# Patient Record
Sex: MALE | Race: WHITE | NOT HISPANIC OR LATINO
[De-identification: names, ages, dates, MRNs, and addresses within clinical notes are randomized per-mention and may not be internally consistent; named-entity substitution may affect disease eponyms.]

---

## 2017-03-29 ENCOUNTER — APPOINTMENT (OUTPATIENT)
Dept: DERMATOLOGY | Facility: CLINIC | Age: 72
End: 2017-03-29

## 2018-03-28 ENCOUNTER — APPOINTMENT (OUTPATIENT)
Dept: DERMATOLOGY | Facility: CLINIC | Age: 73
End: 2018-03-28
Payer: MEDICARE

## 2018-03-28 PROCEDURE — 17000 DESTRUCT PREMALG LESION: CPT

## 2018-03-28 PROCEDURE — 99213 OFFICE O/P EST LOW 20 MIN: CPT | Mod: 25

## 2018-03-28 PROCEDURE — 17003 DESTRUCT PREMALG LES 2-14: CPT

## 2018-06-22 ENCOUNTER — APPOINTMENT (OUTPATIENT)
Dept: DERMATOLOGY | Facility: CLINIC | Age: 73
End: 2018-06-22
Payer: MEDICARE

## 2018-06-22 PROCEDURE — 99213 OFFICE O/P EST LOW 20 MIN: CPT

## 2019-02-27 ENCOUNTER — APPOINTMENT (OUTPATIENT)
Dept: DERMATOLOGY | Facility: CLINIC | Age: 74
End: 2019-02-27
Payer: MEDICARE

## 2019-02-27 PROCEDURE — 17003 DESTRUCT PREMALG LES 2-14: CPT

## 2019-02-27 PROCEDURE — 17110 DESTRUCTION B9 LES UP TO 14: CPT

## 2019-02-27 PROCEDURE — 17000 DESTRUCT PREMALG LESION: CPT | Mod: 59

## 2019-02-27 PROCEDURE — 99214 OFFICE O/P EST MOD 30 MIN: CPT | Mod: 25

## 2019-03-28 ENCOUNTER — APPOINTMENT (OUTPATIENT)
Dept: DERMATOLOGY | Facility: CLINIC | Age: 74
End: 2019-03-28

## 2019-06-15 ENCOUNTER — EMERGENCY (EMERGENCY)
Facility: HOSPITAL | Age: 74
LOS: 1 days | Discharge: HOME | End: 2019-06-15
Attending: EMERGENCY MEDICINE
Payer: MEDICARE

## 2019-06-15 VITALS
WEIGHT: 195.99 LBS | OXYGEN SATURATION: 97 % | TEMPERATURE: 98 F | HEART RATE: 81 BPM | HEIGHT: 70 IN | DIASTOLIC BLOOD PRESSURE: 75 MMHG | SYSTOLIC BLOOD PRESSURE: 111 MMHG | RESPIRATION RATE: 18 BRPM

## 2019-06-15 VITALS
OXYGEN SATURATION: 99 % | RESPIRATION RATE: 20 BRPM | DIASTOLIC BLOOD PRESSURE: 75 MMHG | SYSTOLIC BLOOD PRESSURE: 144 MMHG | HEART RATE: 76 BPM

## 2019-06-15 LAB
ALBUMIN SERPL ELPH-MCNC: 3.8 G/DL — SIGNIFICANT CHANGE UP (ref 3.3–5.2)
ALP SERPL-CCNC: 87 U/L — SIGNIFICANT CHANGE UP (ref 40–120)
ALT FLD-CCNC: 10 U/L — SIGNIFICANT CHANGE UP
ANION GAP SERPL CALC-SCNC: 15 MMOL/L — SIGNIFICANT CHANGE UP (ref 5–17)
AST SERPL-CCNC: 17 U/L — SIGNIFICANT CHANGE UP
BASOPHILS # BLD AUTO: 0 K/UL — SIGNIFICANT CHANGE UP (ref 0–0.2)
BASOPHILS NFR BLD AUTO: 0.4 % — SIGNIFICANT CHANGE UP (ref 0–2)
BILIRUB SERPL-MCNC: 0.3 MG/DL — LOW (ref 0.4–2)
BUN SERPL-MCNC: 14 MG/DL — SIGNIFICANT CHANGE UP (ref 8–20)
CALCIUM SERPL-MCNC: 9.3 MG/DL — SIGNIFICANT CHANGE UP (ref 8.6–10.2)
CHLORIDE SERPL-SCNC: 99 MMOL/L — SIGNIFICANT CHANGE UP (ref 98–107)
CO2 SERPL-SCNC: 23 MMOL/L — SIGNIFICANT CHANGE UP (ref 22–29)
CREAT SERPL-MCNC: 0.71 MG/DL — SIGNIFICANT CHANGE UP (ref 0.5–1.3)
CRP SERPL-MCNC: 4.06 MG/DL — HIGH (ref 0–0.4)
EOSINOPHIL # BLD AUTO: 0 K/UL — SIGNIFICANT CHANGE UP (ref 0–0.5)
EOSINOPHIL NFR BLD AUTO: 0.4 % — SIGNIFICANT CHANGE UP (ref 0–6)
ERYTHROCYTE [SEDIMENTATION RATE] IN BLOOD: 49 MM/HR — HIGH (ref 0–20)
GLUCOSE SERPL-MCNC: 105 MG/DL — SIGNIFICANT CHANGE UP (ref 70–115)
HCT VFR BLD CALC: 35.1 % — LOW (ref 42–52)
HGB BLD-MCNC: 11.8 G/DL — LOW (ref 14–18)
LYMPHOCYTES # BLD AUTO: 1.8 K/UL — SIGNIFICANT CHANGE UP (ref 1–4.8)
LYMPHOCYTES # BLD AUTO: 17.6 % — LOW (ref 20–55)
MCHC RBC-ENTMCNC: 29.9 PG — SIGNIFICANT CHANGE UP (ref 27–31)
MCHC RBC-ENTMCNC: 33.6 G/DL — SIGNIFICANT CHANGE UP (ref 32–36)
MCV RBC AUTO: 89.1 FL — SIGNIFICANT CHANGE UP (ref 80–94)
MONOCYTES # BLD AUTO: 1.3 K/UL — HIGH (ref 0–0.8)
MONOCYTES NFR BLD AUTO: 12.6 % — HIGH (ref 3–10)
NEUTROPHILS # BLD AUTO: 7.1 K/UL — SIGNIFICANT CHANGE UP (ref 1.8–8)
NEUTROPHILS NFR BLD AUTO: 68.8 % — SIGNIFICANT CHANGE UP (ref 37–73)
NT-PROBNP SERPL-SCNC: 135 PG/ML — SIGNIFICANT CHANGE UP (ref 0–300)
PLATELET # BLD AUTO: 528 K/UL — HIGH (ref 150–400)
POTASSIUM SERPL-MCNC: 4.3 MMOL/L — SIGNIFICANT CHANGE UP (ref 3.5–5.3)
POTASSIUM SERPL-SCNC: 4.3 MMOL/L — SIGNIFICANT CHANGE UP (ref 3.5–5.3)
PROT SERPL-MCNC: 7.5 G/DL — SIGNIFICANT CHANGE UP (ref 6.6–8.7)
RBC # BLD: 3.94 M/UL — LOW (ref 4.6–6.2)
RBC # FLD: 12.7 % — SIGNIFICANT CHANGE UP (ref 11–15.6)
SODIUM SERPL-SCNC: 137 MMOL/L — SIGNIFICANT CHANGE UP (ref 135–145)
TROPONIN T SERPL-MCNC: <0.01 NG/ML — SIGNIFICANT CHANGE UP (ref 0–0.06)
WBC # BLD: 10.3 K/UL — SIGNIFICANT CHANGE UP (ref 4.8–10.8)
WBC # FLD AUTO: 10.3 K/UL — SIGNIFICANT CHANGE UP (ref 4.8–10.8)

## 2019-06-15 PROCEDURE — 99284 EMERGENCY DEPT VISIT MOD MDM: CPT

## 2019-06-15 PROCEDURE — 99223 1ST HOSP IP/OBS HIGH 75: CPT

## 2019-06-15 PROCEDURE — 85027 COMPLETE CBC AUTOMATED: CPT

## 2019-06-15 PROCEDURE — 99284 EMERGENCY DEPT VISIT MOD MDM: CPT | Mod: 25

## 2019-06-15 PROCEDURE — 83880 ASSAY OF NATRIURETIC PEPTIDE: CPT

## 2019-06-15 PROCEDURE — 85652 RBC SED RATE AUTOMATED: CPT

## 2019-06-15 PROCEDURE — 74176 CT ABD & PELVIS W/O CONTRAST: CPT | Mod: 26

## 2019-06-15 PROCEDURE — 96365 THER/PROPH/DIAG IV INF INIT: CPT

## 2019-06-15 PROCEDURE — 72131 CT LUMBAR SPINE W/O DYE: CPT | Mod: 26

## 2019-06-15 PROCEDURE — 70450 CT HEAD/BRAIN W/O DYE: CPT | Mod: 26

## 2019-06-15 PROCEDURE — 82550 ASSAY OF CK (CPK): CPT

## 2019-06-15 PROCEDURE — 93005 ELECTROCARDIOGRAM TRACING: CPT

## 2019-06-15 PROCEDURE — 86140 C-REACTIVE PROTEIN: CPT

## 2019-06-15 PROCEDURE — 80053 COMPREHEN METABOLIC PANEL: CPT

## 2019-06-15 PROCEDURE — 70450 CT HEAD/BRAIN W/O DYE: CPT

## 2019-06-15 PROCEDURE — 36415 COLL VENOUS BLD VENIPUNCTURE: CPT

## 2019-06-15 PROCEDURE — 74176 CT ABD & PELVIS W/O CONTRAST: CPT

## 2019-06-15 PROCEDURE — 82553 CREATINE MB FRACTION: CPT

## 2019-06-15 PROCEDURE — 93010 ELECTROCARDIOGRAM REPORT: CPT

## 2019-06-15 PROCEDURE — 84484 ASSAY OF TROPONIN QUANT: CPT

## 2019-06-15 RX ORDER — DEXAMETHASONE 0.5 MG/5ML
10 ELIXIR ORAL ONCE
Refills: 0 | Status: COMPLETED | OUTPATIENT
Start: 2019-06-15 | End: 2019-06-15

## 2019-06-15 RX ORDER — DUTASTERIDE 0.5 MG/1
1 CAPSULE, LIQUID FILLED ORAL
Qty: 0 | Refills: 0 | DISCHARGE

## 2019-06-15 RX ORDER — CHOLECALCIFEROL (VITAMIN D3) 125 MCG
1 CAPSULE ORAL
Qty: 0 | Refills: 0 | DISCHARGE

## 2019-06-15 RX ORDER — TAMSULOSIN HYDROCHLORIDE 0.4 MG/1
1 CAPSULE ORAL
Qty: 0 | Refills: 0 | DISCHARGE

## 2019-06-15 RX ORDER — OMEPRAZOLE 10 MG/1
1 CAPSULE, DELAYED RELEASE ORAL
Qty: 0 | Refills: 0 | DISCHARGE

## 2019-06-15 RX ORDER — RAMIPRIL 5 MG
1 CAPSULE ORAL
Qty: 0 | Refills: 0 | DISCHARGE

## 2019-06-15 RX ADMIN — Medication 10 MILLIGRAM(S): at 16:09

## 2019-06-15 RX ADMIN — Medication 102 MILLIGRAM(S): at 15:33

## 2019-06-15 NOTE — ED ADULT NURSE REASSESSMENT NOTE - NS ED NURSE REASSESS COMMENT FT1
received report from SOFI Velázquez, taking over pt care, pt status unchanged, refer to flowsheet and chart, pt safety maintained, pt hemodynamically stable, pending PT eval post medication administration prior to dispo and future POC

## 2019-06-15 NOTE — ED ADULT NURSE REASSESSMENT NOTE - NS ED NURSE REASSESS COMMENT FT1
pt hemodynamically stable, PIV removed, refer to flowsheet and chart, pt to be discharged, pt understood discharge instructions and plan of care, pt to follow up out-patient, pt able to ambulate with minimal assist

## 2019-06-15 NOTE — ED ADULT NURSE NOTE - OBJECTIVE STATEMENT
received in room accompanied by wife c.o progressively worsening generalized weakness swelling to bilateral lower legs

## 2019-06-15 NOTE — CONSULT NOTE ADULT - SUBJECTIVE AND OBJECTIVE BOX
[facility]                                        [facility] [place]                                  Alex Love, & Júnior                                      [address]                                           [place], [place], [address]                                                [phone]        CC: pain in the joints and "weakness"     HISTORY:  The patient is a 74y Male who reports that in February he began to notice some limitations in motion. This began in the shoulders. He noted pain when he would try to lift his arms over his head. Over the next several months the pain progressed to involve all his joints. He has had increasing difficulty with mobility including ambulation. He has severe pain in the hips and knees and ankles when he tries to walk. This has now progressed to the point where he was unable to get up today.   He had been evaluated by his PCP and an orthopedist and nothing specific was found.   I was called by the ER due to perceived weakness.     PAST MEDICAL & SURGICAL HISTORY:  Hypertension   Cholesterol  Reflux  Prostate disease.     MEDICATION PRIOR TO ADMISSION:  Rosuvastatin (on hold).  Dutasteride  Ramipril  Tamsulosin  Omeprazole  Aspirin    Allergies  No Known Allergies    SOCIAL HISTORY:  Non smoker.     FAMILY HISTORY:  Father: dementia  Mother: breast cancer   Sister: breast cancer   Daughter: Lupus.    ROS:  Constitutional: The patient denies fevers or weight changes.  Neuro: As per HPI.  Eyes: Denies blurry vision.  Ears/nose/throat: Denies Tinnitus.   Cardiac: Denies chest pain. Denies palpitations.  Respiratory: Denies shortness of breath.  GI: Denies abdominal pain, nausea, or vomiting.  : Denies change in urinary pattern.  Integumentary: Denies rash.  Psych: Denies recent mood changes.  Heme: denies easy bleeding/bruising.    Exam:  Vital Signs Last 24 Hrs  T(C): 36.7 (15 Sorin 2019 10:14), Max: 36.7 (15 Sorin 2019 10:14)  T(F): 98 (15 Sorin 2019 10:14), Max: 98 (15 Sorin 2019 10:14)  HR: 81 (15 Sorin 2019 10:14) (81 - 81)  BP: 111/75 (15 Sorin 2019 10:14) (111/75 - 111/75)  RR: 18 (15 Sorin 2019 10:14) (18 - 18)  SpO2: 97% (15 Sorin 2019 10:14) (97% - 97%)  General: NAD.   Carotid bruits absent.   There is tenderness in all joints to palpation.   There is swelling in the ankles bilaterally.     Mental status: The patient is awake, alert, and fully oriented. There is no aphasia.    Cranial nerves: There is no papilledema. Pupils react symmetrically to light. There is no visual field deficit to confrontation. Extraocular motion is full with no nystagmus. There is no ptosis. Facial sensation is intact. Facial musculature is symmetric. Palate elevates symmetrically. Tongue is midline.    Motor: There is normal muscle bulk. Muscle tone is difficult to assess due to pain with passive movement of joints.   Strength is 5/5 in the upper extremities briefly although cannot abduct at deltoids due to shoulder pain. Can only flex hips ~15 degrees before pain becomes limiting. Can flex knees ~30 degrees before pain limits him.     Sensation: Intact to light touch and pin.    Reflexes: 2+ throughout and plantar responses are flexor.    Cerebellar: He is unable to perform finger nose finger due to pain. There is no dysmetria when going from one finger to another.    LABS:                         11.8   10.3  )-----------( 528      ( 15 Sorin 2019 11:39 )             35.1       06-15    137  |  99  |  14.0  ----------------------------<  105  4.3   |  23.0  |  0.71    Ca    9.3      15 Osrin 2019 11:39    TPro  7.5  /  Alb  3.8  /  TBili  0.3<L>  /  DBili  x   /  AST  17  /  ALT  10  /  AlkPhos  87  06-15      RADIOLOGY   CT head pending.

## 2019-06-15 NOTE — ED STATDOCS - PROGRESS NOTE DETAILS
Patient with PMH, HLD, HTN, BPH presents complaining of swelling in joints and generalized weakness. Symptoms started in february, initially in shoulders, was sent to an orthopedist who did imaging of neck and was told it was herniated discs and went to pain management, who put him on neurontin. Meanwhile, he has worsening edema of the lower legs. He was started on 20 mg of furosemide which has not made any difference in the swelling. He also saw his cardiologist (Jacquelin) who increased his statin from 20 to 40 mg at which point his symptoms worsened and 6 days ago he stopped that completely. He is unable to get himself out of a chair, and when he does, he cannot ambulate forward due to lack of strength in his legs, but doesn't feel like his going to fall down. He has been ambulating with shuffling gait, now unable to ambulate at home. He was on a course of prednisone in May, which improved his symptoms, which worsened after he stopped it. He had blood work which showed very high CRP.  PMD: Daro  Exam: NAD, standing still, 4+ bilateral LE edema, unable to raise bilateral shoulders > 15 degrees, when sitting, left hip flex to 45 degrees, right hip flex to 20 degrees. Full ROM of the bilateral knees.  Initial orders placed, patient will be sent to the main ED for further evaluation by another physician.

## 2019-06-15 NOTE — CONSULT NOTE ADULT - ASSESSMENT
The patient is a 74y Male with progressive joint pains limiting movement.   He has strength however active manual motor testing and passive movement of extremities causes pain in all joints.     Weakness  Does not appear to be neurologic in origin.   Agree with checking CK level to assess for muscle injury.    Joint pain.  Check ESR and CRP as ordered.   Has family history of Lupus.   Recommend Rheumatology evaluation.     Case discussed with ER attending Dr Gage.

## 2019-06-15 NOTE — ED ADULT TRIAGE NOTE - CHIEF COMPLAINT QUOTE
Patient arrived to ED today with c/o general weakness and body aches and pain over the past month, back pain due to disc issues.  patient has been receiving PT.

## 2019-06-19 NOTE — ED PROVIDER NOTE - PHYSICAL EXAMINATION
Alert, lucid, and in no apparent distress. Pt is normocephalic, atraumatic.  Pupils are equal, round, no dysmetria. External ear without bleeding or mass, no nasal discharge or malodor, lips pink, moist mucous membranes, tongue midline. Neck supple.   Lungs clear to ausultation. Heart regular rate and rhythm, normal S1, S2, no murmurs, gallops, rubs.  Abdomen is soft, nontender, no pulsatile mass, no masses, no distension, no rebound. No CVA Tenderness, no suprapubic tenderness.   Non-focal sensory, 5 out of 5 motor strength, no dysmetria, fluent, goal directed speech. CN2 to 12 intact. Skin without rash, purpura, eccyhmosis  No submandibular adenopathy. Normal mentation, does not appear agitated    lower ext edema +3

## 2019-06-19 NOTE — ED PROVIDER NOTE - OBJECTIVE STATEMENT
yo male pmh lower extremity edema with 4 monh history of skhoulder pan; pt noted txed with edema with furosemide; pt also treated  with steroids with relief of symptoms; pt has seen primary care doct, cardilology ;

## 2020-01-06 ENCOUNTER — APPOINTMENT (OUTPATIENT)
Dept: DERMATOLOGY | Facility: CLINIC | Age: 75
End: 2020-01-06
Payer: MEDICARE

## 2020-01-06 PROCEDURE — 17000 DESTRUCT PREMALG LESION: CPT | Mod: 59

## 2020-01-06 PROCEDURE — 17003 DESTRUCT PREMALG LES 2-14: CPT | Mod: 59

## 2020-01-06 PROCEDURE — 17110 DESTRUCTION B9 LES UP TO 14: CPT

## 2020-01-06 PROCEDURE — 99214 OFFICE O/P EST MOD 30 MIN: CPT | Mod: 25

## 2020-06-23 ENCOUNTER — APPOINTMENT (OUTPATIENT)
Dept: DERMATOLOGY | Facility: CLINIC | Age: 75
End: 2020-06-23

## 2020-06-24 ENCOUNTER — APPOINTMENT (OUTPATIENT)
Dept: DERMATOLOGY | Facility: CLINIC | Age: 75
End: 2020-06-24
Payer: MEDICARE

## 2020-06-24 ENCOUNTER — RESULT REVIEW (OUTPATIENT)
Age: 75
End: 2020-06-24

## 2020-06-24 PROCEDURE — 99213 OFFICE O/P EST LOW 20 MIN: CPT | Mod: 25

## 2020-06-24 PROCEDURE — 17000 DESTRUCT PREMALG LESION: CPT | Mod: 59

## 2020-06-24 PROCEDURE — 11102 TANGNTL BX SKIN SINGLE LES: CPT

## 2020-06-24 PROCEDURE — 17003 DESTRUCT PREMALG LES 2-14: CPT

## 2020-08-04 ENCOUNTER — APPOINTMENT (OUTPATIENT)
Dept: DERMATOLOGY | Facility: CLINIC | Age: 75
End: 2020-08-04
Payer: MEDICARE

## 2020-08-04 DIAGNOSIS — C44.319 BASAL CELL CARCINOMA OF SKIN OF OTHER PARTS OF FACE: ICD-10-CM

## 2020-08-04 PROCEDURE — 13132 CMPLX RPR F/C/C/M/N/AX/G/H/F: CPT

## 2020-08-04 PROCEDURE — 17311 MOHS 1 STAGE H/N/HF/G: CPT

## 2020-08-10 ENCOUNTER — APPOINTMENT (OUTPATIENT)
Dept: DERMATOLOGY | Facility: CLINIC | Age: 75
End: 2020-08-10
Payer: MEDICARE

## 2020-08-10 ENCOUNTER — APPOINTMENT (OUTPATIENT)
Dept: DERMATOLOGY | Facility: CLINIC | Age: 75
End: 2020-08-10

## 2020-08-10 PROCEDURE — 17000 DESTRUCT PREMALG LESION: CPT | Mod: 79

## 2020-08-10 PROCEDURE — 17003 DESTRUCT PREMALG LES 2-14: CPT | Mod: 79

## 2020-08-10 PROCEDURE — 99214 OFFICE O/P EST MOD 30 MIN: CPT | Mod: 24,25

## 2021-02-09 ENCOUNTER — APPOINTMENT (OUTPATIENT)
Dept: DERMATOLOGY | Facility: CLINIC | Age: 76
End: 2021-02-09
Payer: MEDICARE

## 2021-02-09 PROCEDURE — 17000 DESTRUCT PREMALG LESION: CPT | Mod: 59

## 2021-02-09 PROCEDURE — 17110 DESTRUCTION B9 LES UP TO 14: CPT

## 2021-02-09 PROCEDURE — 99214 OFFICE O/P EST MOD 30 MIN: CPT | Mod: 25

## 2021-05-20 ENCOUNTER — EMERGENCY (EMERGENCY)
Facility: HOSPITAL | Age: 76
LOS: 1 days | End: 2021-05-20
Attending: EMERGENCY MEDICINE
Payer: MEDICARE

## 2021-05-20 VITALS
HEIGHT: 70 IN | WEIGHT: 190.04 LBS | SYSTOLIC BLOOD PRESSURE: 161 MMHG | DIASTOLIC BLOOD PRESSURE: 87 MMHG | HEART RATE: 66 BPM | OXYGEN SATURATION: 96 % | RESPIRATION RATE: 18 BRPM | TEMPERATURE: 98 F

## 2021-05-20 PROCEDURE — 99284 EMERGENCY DEPT VISIT MOD MDM: CPT

## 2021-05-20 RX ORDER — LIDOCAINE 4 G/100G
1 CREAM TOPICAL ONCE
Refills: 0 | Status: COMPLETED | OUTPATIENT
Start: 2021-05-20 | End: 2021-05-20

## 2021-05-20 RX ORDER — ACETAMINOPHEN 500 MG
650 TABLET ORAL ONCE
Refills: 0 | Status: COMPLETED | OUTPATIENT
Start: 2021-05-20 | End: 2021-05-20

## 2021-05-20 RX ADMIN — Medication 650 MILLIGRAM(S): at 21:50

## 2021-05-20 RX ADMIN — LIDOCAINE 1 PATCH: 4 CREAM TOPICAL at 21:50

## 2021-05-21 VITALS
HEART RATE: 56 BPM | DIASTOLIC BLOOD PRESSURE: 79 MMHG | SYSTOLIC BLOOD PRESSURE: 139 MMHG | OXYGEN SATURATION: 96 % | RESPIRATION RATE: 16 BRPM

## 2021-05-21 DIAGNOSIS — Z98.890 OTHER SPECIFIED POSTPROCEDURAL STATES: Chronic | ICD-10-CM

## 2021-05-21 PROCEDURE — 72148 MRI LUMBAR SPINE W/O DYE: CPT | Mod: 26,MA

## 2021-05-21 PROCEDURE — 99283 EMERGENCY DEPT VISIT LOW MDM: CPT

## 2021-05-21 PROCEDURE — ZZZZZ: CPT

## 2021-05-21 PROCEDURE — 74176 CT ABD & PELVIS W/O CONTRAST: CPT | Mod: 26,MA

## 2021-05-21 PROCEDURE — 99284 EMERGENCY DEPT VISIT MOD MDM: CPT | Mod: 25

## 2021-05-21 PROCEDURE — 72131 CT LUMBAR SPINE W/O DYE: CPT | Mod: 26,MA

## 2021-05-21 PROCEDURE — 72148 MRI LUMBAR SPINE W/O DYE: CPT

## 2021-05-21 PROCEDURE — 74176 CT ABD & PELVIS W/O CONTRAST: CPT

## 2021-05-21 RX ORDER — IBUPROFEN 200 MG
1 TABLET ORAL
Qty: 28 | Refills: 0
Start: 2021-05-21 | End: 2021-05-27

## 2021-05-21 RX ORDER — ACETAMINOPHEN 500 MG
2 TABLET ORAL
Qty: 56 | Refills: 0
Start: 2021-05-21 | End: 2021-05-27

## 2021-05-21 RX ORDER — OXYCODONE HYDROCHLORIDE 5 MG/1
5 TABLET ORAL ONCE
Refills: 0 | Status: DISCONTINUED | OUTPATIENT
Start: 2021-05-21 | End: 2021-05-21

## 2021-05-21 RX ADMIN — OXYCODONE HYDROCHLORIDE 5 MILLIGRAM(S): 5 TABLET ORAL at 04:54

## 2021-05-21 RX ADMIN — Medication 650 MILLIGRAM(S): at 04:55

## 2021-05-21 NOTE — CHART NOTE - NSCHARTNOTEFT_GEN_A_CORE
HPI: Patient is a 76y old  Male who presents with a chief complaint of slip down stairs. Patient states isolated injury to lower back, denies any motor or sensory changes, no LOC.    INTERVAL HPI/OVERNIGHT EVENTS:  76y Male seen sitting up in a chair with wife at bedside. Pt. complaining of sharp back pain. Denies leg pain, weakness, numbness/tingling, bowel/bladder dysfunction, headache.    Vital Signs Last 24 Hrs  T(C): 36.4 (20 May 2021 20:31), Max: 36.4 (20 May 2021 20:31)  T(F): 97.5 (20 May 2021 20:31), Max: 97.5 (20 May 2021 20:31)  HR: 56 (21 May 2021 04:55) (56 - 66)  BP: 139/79 (21 May 2021 04:55) (139/79 - 161/87)  BP(mean): --  RR: 16 (21 May 2021 04:55) (16 - 18)  SpO2: 96% (21 May 2021 04:55) (96% - 96%)    PHYSICAL EXAM:  GENERAL: NAD, well-groomed, well-developed  HEAD:  Atraumatic, normocephalic  MENTAL STATUS: AAO x3; Awake; Opens eyes spontaneously; Appropriately conversant without aphasia; following simple commands  MOTOR: strength 5/5 b/l upper and lower extremities  SENSATION: grossly intact to light touch all extremities  EXTREMITIES:  2+ peripheral pulses, no clubbing, cyanosis, or edema  SKIN: Warm, dry; no rashes or lesions      RADIOLOGY & ADDITIONAL TESTS:  < from: MR Lumbar Spine No Cont (05.21.21 @ 04:08) >    IMPRESSION:    Known nondisplaced left-sided L1 and L2 transverse process fractures better seen on CT. Mild ascites paraspinal soft tissue edema in the region of the transverse process fractures.    No evidence of lumbar vertebral body fracture or malalignment. No evidence of ligamentous injury. No compression of the cauda equina nerve roots.            KAREN CHAVEZ DO; Attending Radiologist  This document has been electronically signed. May 21 2021  4:31AM    < end of copied text >    < from: CT Lumbar Spine Reform No Cont (05.21.21 @ 01:49) >      IMPRESSION:  1. No nephrolithiasis, hydronephrosis or obstructive uropathy.  2. Acute nondisplaced left L1 and L2 transverse process fractures.              TRINITY CONTRERAS MD; Attending Radiologist  This document has been electronically signed. May 21 2021  2:35AM    < end of copied text >    Assessment: 77 y/o M s/p slip & fall down stairs, complaining of back pain. MRI lumbar spine reveals nondisplaced L1-L2 transverse process fractures.    Plan:  -D/w Dr. Brizuela  -Imaging reviewed  -No acute neurosurgical intervention indicated  -Pain control PRN; avoid oversedation  -Follow up with Dr. Brizuela in his office in 2-4 weeks  -Neurosurgery signing off; please recall as needed

## 2021-05-21 NOTE — ED PROVIDER NOTE - PHYSICAL EXAMINATION
+TTP over left paraspinal lumbar region. No midline CTL spine tenderness. No pelvic bone tenderness, steady gait. sensation equal bilaterally. Neck with Full ROM, No TTP of upper and lower extremities. strength 5/5 b/l

## 2021-05-21 NOTE — CONSULT NOTE ADULT - ASSESSMENT
IMP:  PATIENT SLIP AND FALL DOWN STAIRS       CT RECONS L SPINE WITH Acute nondisplaced left L1 and L2 transverse process fractures        PLAN:     MRI L SPINE    IMP:  PATIENT SLIP AND FALL DOWN STAIRS       CT RECONS L SPINE WITH Acute nondisplaced left L1 and L2 transverse process fractures    MRI L SPINE WITH Known nondisplaced left-sided L1 and L2 transverse process fractures better seen on CT. Mild ascites paraspinal soft tissue edema in the region of the transverse process fractures. No evidence of lumbar vertebral body fracture or malalignment. No evidence of ligamentous injury. No compression of the cauda equina nerve roots    PLAN:     MRI L SPINE    IMP:  PATIENT SLIP AND FALL DOWN STAIRS     ON EXAM: No C/T Spine tenderness No point spinal L spine tenderness + left para spinal  tender l1-3 level   NEURO INTACT ALL, MOTOR 5/5 ALL, SENSORY INTACT ALL     CT RECONS L SPINE WITH Acute nondisplaced left L1 and L2 transverse process fractures    MRI L SPINE WITH Known nondisplaced left-sided L1 and L2 transverse process fractures better seen on CT. Mild ascites paraspinal soft tissue edema in the region of the transverse process fractures. No evidence of lumbar vertebral body fracture or malalignment. No evidence of ligamentous injury. No compression of the cauda equina nerve roots    PLAN:

## 2021-05-21 NOTE — ED PROVIDER NOTE - CARE PROVIDER_API CALL
Fede Brizuela; PhD)  Neurosurgery  [address] [place] [address]  Phone: [phone]  Fax: [phone]  Follow Up Time: 7-10 Days

## 2021-05-21 NOTE — ED PROVIDER NOTE - CLINICAL SUMMARY MEDICAL DECISION MAKING FREE TEXT BOX
75 y/o M with left lower back pain s/p slip and fall down last 2 stairs hitting edge to his lower back. No motor or sensory deficit. Lidocaine patch and Tylenol given. Hours later oxycodone given. CT shows acute nondisplaced left L1-L2 transverse process fractures. Neurosurgery MARGARITO Johns spoken to and requesting MR lumbar spine and then she saw pt after results and requested pt to stay to see Dr. Brizuela. case discussed with MARGARITO Crenshaw and she will follow plan of Dr. FLORES

## 2021-05-21 NOTE — ED PROVIDER NOTE - PROGRESS NOTE DETAILS
MARGARITO GELLER:  sign out MARGARITO Crain, mechanical fall with transverse fracture, NEUROsx aware, pending plan.  pt ambulatory stable gait no distress.   neurosx at bedside, cleared for discharge with fu in 1-2 weeks.

## 2021-05-21 NOTE — ED PROVIDER NOTE - NSFOLLOWUPINSTRUCTIONS_ED_ALL_ED_FT
please follow with SPINE and PMD   please take prescribed pain control as directed   new or worsening symptoms such as severe pain, bladder or bowel incontinence, difficulty ambulating please return to the ER immediately     Fracture  A fracture is a break in one of your bones. This can occur from a variety of injuries, especially traumatic ones. Symptoms include pain, bruising, or swelling. Do not use the injured limb. If a fracture is in one of the bones below your waist, do not put weight on that limb unless instructed to do so by your healthcare provider. Crutches or a cane may have been provided. A splint or cast may have been applied by your health care provider. Make sure to keep it dry and follow up with an orthopedist as instructed.    SEEK IMMEDIATE MEDICAL CARE IF YOU HAVE ANY OF THE FOLLOWING SYMPTOMS: numbness, tingling, increasing pain, or weakness in any part of the injured limb.     Back Pain  Back pain is very common in adults. The cause of back pain is rarely dangerous and the pain often gets better over time. The cause of your back pain may not be known and may include strain of muscles or ligaments, degeneration of the spinal disks, or arthritis. Occasionally the pain may radiate down your leg(s). Over-the-counter medicines to reduce pain and inflammation are often the most helpful. Stretching and remaining active frequently helps the healing process.     SEEK IMMEDIATE MEDICAL CARE IF YOU HAVE ANY OF THE FOLLOWING SYMPTOMS: bowel or bladder control problems, unusual weakness or numbness in your arms or legs, nausea or vomiting, abdominal pain, fever, dizziness/lightheadedness.

## 2021-05-21 NOTE — ED PROVIDER NOTE - ATTENDING CONTRIBUTION TO CARE
The patient seen and examined    Lumbar spine fracture    I, Edmar Guerra, performed the initial face to face bedside interview with this patient regarding history of present illness, review of symptoms and relevant past medical, social and family history.  I completed an independent physical examination.  I was the initial provider who evaluated this patient. I have signed out the follow up of any pending tests (i.e. labs, radiological studies) to the ACP.  I have communicated the patient’s plan of care and disposition with the ACP.

## 2021-05-21 NOTE — ED PROVIDER NOTE - PATIENT PORTAL LINK FT
You can access the FollowMyHealth Patient Portal offered by [facility] by registering at the following website: [email or web address]. By joining [employer]’s FollowMyHealth portal, you will also be able to view your health information using other applications (apps) compatible with our system.

## 2021-06-02 ENCOUNTER — APPOINTMENT (OUTPATIENT)
Dept: NEUROSURGERY | Facility: CLINIC | Age: 76
End: 2021-06-02
Payer: MEDICARE

## 2021-06-02 VITALS
HEART RATE: 58 BPM | DIASTOLIC BLOOD PRESSURE: 79 MMHG | TEMPERATURE: 97.7 F | OXYGEN SATURATION: 96 % | SYSTOLIC BLOOD PRESSURE: 131 MMHG

## 2021-06-02 PROCEDURE — 99214 OFFICE O/P EST MOD 30 MIN: CPT

## 2021-06-02 RX ORDER — ROSUVASTATIN CALCIUM 40 MG/1
40 TABLET, FILM COATED ORAL
Qty: 90 | Refills: 0 | Status: ACTIVE | COMMUNITY
Start: 2020-12-04

## 2021-06-02 RX ORDER — DUTASTERIDE 0.5 MG/1
0.5 CAPSULE, LIQUID FILLED ORAL
Qty: 90 | Refills: 0 | Status: ACTIVE | COMMUNITY
Start: 2020-12-28

## 2021-06-02 RX ORDER — CLINDAMYCIN PHOSPHATE 10 MG/ML
1 SOLUTION TOPICAL
Qty: 60 | Refills: 0 | Status: ACTIVE | COMMUNITY
Start: 2021-02-09

## 2021-06-02 RX ORDER — PREDNISONE 1 MG/1
1 TABLET ORAL
Qty: 270 | Refills: 0 | Status: ACTIVE | COMMUNITY
Start: 2020-12-20

## 2021-06-02 RX ORDER — IBUPROFEN 600 MG/1
600 TABLET, FILM COATED ORAL
Qty: 28 | Refills: 0 | Status: ACTIVE | COMMUNITY
Start: 2021-05-21

## 2021-06-02 RX ORDER — RAMIPRIL 2.5 MG/1
2.5 CAPSULE ORAL
Qty: 90 | Refills: 0 | Status: ACTIVE | COMMUNITY
Start: 2021-03-03

## 2021-06-02 RX ORDER — CIPROFLOXACIN AND DEXAMETHASONE 3; 1 MG/ML; MG/ML
0.3-0.1 SUSPENSION/ DROPS AURICULAR (OTIC)
Qty: 8 | Refills: 0 | Status: ACTIVE | COMMUNITY
Start: 2021-03-02

## 2021-06-02 RX ORDER — ALFUZOSIN HYDROCHLORIDE 10 MG/1
10 TABLET, EXTENDED RELEASE ORAL
Qty: 90 | Refills: 0 | Status: ACTIVE | COMMUNITY
Start: 2021-04-27

## 2021-06-02 RX ORDER — OXYBUTYNIN CHLORIDE 5 MG/1
5 TABLET, EXTENDED RELEASE ORAL
Qty: 90 | Refills: 0 | Status: ACTIVE | COMMUNITY
Start: 2021-04-27

## 2021-06-02 RX ORDER — OXYCODONE AND ACETAMINOPHEN 2.5; 325 MG/1; MG/1
2.5-325 TABLET ORAL
Qty: 9 | Refills: 0 | Status: ACTIVE | COMMUNITY
Start: 2021-05-21

## 2021-06-02 RX ORDER — TADALAFIL 20 MG/1
20 TABLET ORAL
Qty: 6 | Refills: 0 | Status: ACTIVE | COMMUNITY
Start: 2021-05-20

## 2021-06-02 NOTE — HISTORY OF PRESENT ILLNESS
[FreeTextEntry1] : MOY SANCHEZ is a 76 year old male with PMH of polymyalgia rheumatica, and HLD, who presents for follow up neurosurgical evaluation of L1 & L2 transverse process fractures after a fall down 2 steps 5/20/21. He states he was walking down the wooden stairs with socks on, slipped, fell and hit his lower back on the edge of the step. He denies hitting his head, LOC, numbness/tingling, or weakness of his lower extremities. \par The pain started out as shooting/burning localized pain. Now it is dull ache, no sharp shooting pain, and no radiation down the legs. He denies any incontinence of bowel/bladder, no pain in legs,  numbness/tingling, loss of muscle or strength in lower extremities. He is not taking any medications for pain and is generally feeling well. His balance and gait are the same as well. No other complaints.\par \par \par \par

## 2021-06-02 NOTE — PHYSICAL EXAM
[General Appearance - Alert] : alert [General Appearance - Well Nourished] : well nourished [General Appearance - In No Acute Distress] : in no acute distress [Oriented To Time, Place, And Person] : oriented to person, place, and time [Impaired Insight] : insight and judgment were intact [Affect] : the affect was normal [Person] : oriented to person [Place] : oriented to place [Time] : oriented to time [Motor Strength] : muscle strength was normal in all four extremities [Sensation Tactile Decrease] : light touch was intact [Sensation Pain / Temperature Decrease] : pain and temperature was intact [Balance] : balance was intact [No Visual Abnormalities] : no visible abnormailities [No Tenderness to Palpation] : no spine tenderness on palpation [Normal] : normal [Able to toe walk] : the patient was able to toe walk [Able to heel walk] : the patient was able to heel walk [PERRL With Normal Accommodation] : pupils were equal in size, round, reactive to light, with normal accommodation [Sclera] : the sclera and conjunctiva were normal [Neck Appearance] : the appearance of the neck was normal [] : no respiratory distress [No Spinal Tenderness] : no spinal tenderness [Abnormal Walk] : normal gait [Involuntary Movements] : no involuntary movements were seen [Motor Tone] : muscle strength and tone were normal [FreeTextEntry6] : N

## 2021-06-02 NOTE — DATA REVIEWED
[de-identified] : EXAM: CT REFORM SPINE L\par  EXAM: CT ABDOMEN AND PELVIS\par \par PROCEDURE DATE: 05/21/2021\par \par \par \par INTERPRETATION: CLINICAL INFORMATION: Flank and left lower back pain. Status post slip and fall down 2 stairs.\par \par COMPARISON: CT of the abdomen and pelvis and lumbar spine from 6/15/2019.\par \par CONTRAST/COMPLICATIONS:\par IV Contrast: NONE Evaluation of the visceral organs is limited without intravenous contrast\par Oral Contrast: NONE\par Complications: None reported at time of study completion\par \par PROCEDURE:\par CT of the Abdomen and Pelvis was performed.\par Sagittal and coronal reformats were performed.\par Coned-down axial, sagittal and coronal reformats of the lumbar spine utilizing the source images from the CT of the abdomen and pelvis.\par \par FINDINGS:\par LOWER CHEST: Bibasilar subsegmental atelectasis. Right lower lobe calcified granuloma. Mild coronary artery calcifications.\par \par LIVER: Within normal limits.\par BILE DUCTS: Normal caliber.\par GALLBLADDER: Within normal limits.\par SPLEEN: Within normal limits.\par PANCREAS: Within normal limits.\par ADRENALS: Within normal limits.\par KIDNEYS/URETERS: No nephrolithiasis, hydronephrosis or obstructive uropathy.\par \par BLADDER: Within normal limits.\par REPRODUCTIVE ORGANS: Prostate within normal limits.\par \par BOWEL: No bowel obstruction or inflammation. Appendix is normal.\par PERITONEUM: No ascites.\par VESSELS: Within normal limits.\par RETROPERITONEUM/LYMPH NODES: No lymphadenopathy.\par ABDOMINAL WALL: Small fat-containing umbilical and left inguinal hernias.\par BONES: Acute nondisplaced left L1 and L2 transverse process fractures. No additional fracture or dislocation.\par \par LUMBAR SPINE:\par 5 lumbar type vertebral bodies. Lumbar vertebral body heights and alignment are maintained. Acute nondisplaced left L1 and L2 transverse process fractures. Disc space heights are preserved.\par At L3-L4, there is a broad-based disc bulge resulting in flattening of the ventral thecal sac and minimal bilateral neural foraminal stenosis.\par At L4-L5, there is a broad-based disc bulge and ligamentum flavum hypertrophy resulting in mild spinal canal stenosis and mild right and minimal left neural foraminal stenosis.\par At L5-S1, there is a broad-based disc bulge resulting in flattening of the ventral thecal sac and minimal bilateral neural foraminal stenosis.\par \par IMPRESSION:\par 1. No nephrolithiasis, hydronephrosis or obstructive uropathy.\par 2. Acute nondisplaced left L1 and L2 transverse process fractures.\par  [de-identified] : EXAM: MR SPINE LUMBAR\par \par PROCEDURE DATE: 05/21/2021\par \par \par \par INTERPRETATION: CLINICAL INFORMATION: Nondisplaced left sided L1 and L2 transverse process fractures.\par \par TECHNIQUE: MRI of the lumbar spine is performed using accommodation of sagittal and axial T2-weighted images, sagittal and axial T1-weighted images, sagittal STIR images, and coronal T2-weighted images.\par \par COMPARISON: CT of the lumbar spine from 5/21/2021.\par \par FINDINGS:\par \par Lumbar vertebral body heights are normal. Lumbar vertebral elements are well aligned. There is no marrow signal abnormality within the vertebral bodies, specifically, no marrow edema to suggest vertebral body fracture. Known nondisplaced left-sided L1 and L2 transverse process fractures are better seen on the CT. There is no evidence of ligamentous injury. There is mild left-sided paraspinal soft tissue edema in the region of the L1 and L2 transverse process fractures. There is no evidence of an epidural hematoma. There is multilevel disc degeneration without significant loss of disc space height. There are minimal disc bulges at L3-L4, L4-L5, and L5-S1 with combination of facet and ligamentous hypertrophy at L4-L5 resulting in mild segmental canal stenosis. There is no compression of the cauda equina nerve roots. There is no significant lateral recess or neural foraminal stenosis. The conus medullaris terminates at L1. There is no signal abnormality of the visualized thoracic cord.\par \par The visualized portions of the abdomen and pelvis are unremarkable.\par \par IMPRESSION:\par \par Known nondisplaced left-sided L1 and L2 transverse process fractures better seen on CT. Mild ascites paraspinal soft tissue edema in the region of the transverse process fractures.\par \par No evidence of lumbar vertebral body fracture or malalignment. No evidence of ligamentous injury. No compression of the cauda equina nerve roots.\par

## 2021-06-02 NOTE — ASSESSMENT
[FreeTextEntry1] : Patient with above history and imaging. No neurological deficits. Symptoms improving will do follow up Xray to assess. Advised he can gradually increase activity, fractures just need time to heal. \par \par Plan:\par Xray 6 weeks from accident\par f/u after imaging\par Patient knows to call the office if there are any new or worsening symptoms. \par All questions and concerns answered and addressed in detail to patient's complete satisfaction. Patient verbalized understanding and agreed to plan.\par \par

## 2021-06-05 ENCOUNTER — TRANSCRIPTION ENCOUNTER (OUTPATIENT)
Age: 76
End: 2021-06-05

## 2021-06-08 PROBLEM — E78.5 HYPERLIPIDEMIA, UNSPECIFIED: Chronic | Status: ACTIVE | Noted: 2021-05-21

## 2021-06-08 PROBLEM — M35.3 POLYMYALGIA RHEUMATICA: Chronic | Status: ACTIVE | Noted: 2021-05-21

## 2021-06-18 ENCOUNTER — APPOINTMENT (OUTPATIENT)
Dept: DERMATOLOGY | Facility: CLINIC | Age: 76
End: 2021-06-18
Payer: MEDICARE

## 2021-06-18 PROCEDURE — 17000 DESTRUCT PREMALG LESION: CPT

## 2021-06-18 PROCEDURE — 99213 OFFICE O/P EST LOW 20 MIN: CPT | Mod: 25

## 2021-07-07 ENCOUNTER — OUTPATIENT (OUTPATIENT)
Dept: OUTPATIENT SERVICES | Facility: HOSPITAL | Age: 76
LOS: 1 days | End: 2021-07-07
Payer: MEDICARE

## 2021-07-07 ENCOUNTER — APPOINTMENT (OUTPATIENT)
Dept: RADIOLOGY | Facility: CLINIC | Age: 76
End: 2021-07-07
Payer: MEDICARE

## 2021-07-07 DIAGNOSIS — S32.009A UNSPECIFIED FRACTURE OF UNSPECIFIED LUMBAR VERTEBRA, INITIAL ENCOUNTER FOR CLOSED FRACTURE: ICD-10-CM

## 2021-07-07 DIAGNOSIS — Z98.890 OTHER SPECIFIED POSTPROCEDURAL STATES: Chronic | ICD-10-CM

## 2021-07-07 PROCEDURE — 72100 X-RAY EXAM L-S SPINE 2/3 VWS: CPT

## 2021-07-07 PROCEDURE — 72100 X-RAY EXAM L-S SPINE 2/3 VWS: CPT | Mod: 26

## 2021-07-09 ENCOUNTER — APPOINTMENT (OUTPATIENT)
Dept: NEUROSURGERY | Facility: CLINIC | Age: 76
End: 2021-07-09
Payer: MEDICARE

## 2021-07-09 VITALS
SYSTOLIC BLOOD PRESSURE: 117 MMHG | OXYGEN SATURATION: 98 % | HEIGHT: 70 IN | HEART RATE: 53 BPM | BODY MASS INDEX: 26.48 KG/M2 | DIASTOLIC BLOOD PRESSURE: 70 MMHG | WEIGHT: 185 LBS | TEMPERATURE: 97.9 F

## 2021-07-09 DIAGNOSIS — S32.009A UNSPECIFIED FRACTURE OF UNSPECIFIED LUMBAR VERTEBRA, INITIAL ENCOUNTER FOR CLOSED FRACTURE: ICD-10-CM

## 2021-07-09 PROCEDURE — 99214 OFFICE O/P EST MOD 30 MIN: CPT

## 2021-07-09 NOTE — ASSESSMENT
[FreeTextEntry1] : Patient with above history and imaging. No neurological deficits. Patient does not have any pain or symptoms from L1 & L2 transverse process fractures, Xray does not show any concerning findings. Patient may resume activities as tolerated.\par \par Plan:\par Follow up if needed\par Patient knows to call the office if there are any new or worsening symptoms. \par All questions and concerns answered and addressed in detail to patient's complete satisfaction. Patient verbalized understanding and agreed to plan.\par \par

## 2021-07-09 NOTE — PHYSICAL EXAM
[General Appearance - Alert] : alert [General Appearance - In No Acute Distress] : in no acute distress [General Appearance - Well Nourished] : well nourished [Oriented To Time, Place, And Person] : oriented to person, place, and time [Impaired Insight] : insight and judgment were intact [Person] : oriented to person [Place] : oriented to place [Time] : oriented to time [Motor Strength] : muscle strength was normal in all four extremities [Sensation Tactile Decrease] : light touch was intact [Sensation Pain / Temperature Decrease] : pain and temperature was intact [Balance] : balance was intact [No Visual Abnormalities] : no visible abnormailities [No Tenderness to Palpation] : no spine tenderness on palpation [Normal] : normal [Able to toe walk] : the patient was able to toe walk [Able to heel walk] : the patient was able to heel walk [Sclera] : the sclera and conjunctiva were normal [PERRL With Normal Accommodation] : pupils were equal in size, round, reactive to light, with normal accommodation [Neck Appearance] : the appearance of the neck was normal [] : no respiratory distress [No Spinal Tenderness] : no spinal tenderness [Abnormal Walk] : normal gait [Involuntary Movements] : no involuntary movements were seen [Motor Tone] : muscle strength and tone were normal [FreeTextEntry8] : Tandem walk intact

## 2021-07-09 NOTE — DATA REVIEWED
[de-identified] : EXAM: XR LS SPINE AP LAT 2-3 VIEWS\par \par \par PROCEDURE DATE: 07/07/2021\par \par \par \par INTERPRETATION: CLINICAL INDICATION: follow-up L1 and L2 transverse fractures\par \par EXAM:\par AP lateral lumbosacral spine from 7/7/2021 at 1050. Reviewed in conjunction with L-spine CT from 5/21/2021.\par \par IMPRESSION:\par Previously observed left L1 and L2 transverse process fractures not as well demonstrated radiographically due to superimposed bowel content.\par \par No compression fractures, spondylolistheses, or spondylolysis defects.\par \par Disk space heights preserved and facet alignment maintained.\par \par Unremarkable SI joints and partially visualized hips.\par \par No lytic or blastic lesions.

## 2021-07-09 NOTE — HISTORY OF PRESENT ILLNESS
[FreeTextEntry1] : MOY SANCHEZ is a 76 year old male with PMH of polymyalgia rheumatica, and HLD, who presents for follow up neurosurgical evaluation of L1 & L2 transverse process fractures after a fall down 2 steps 5/20/21. He states he was walking down the wooden stairs with socks on, slipped, fell and hit his lower back on the edge of the step. He denies hitting his head, LOC, numbness/tingling, or weakness of his lower extremities. The pain started out as shooting/burning localized pain. \par Currently he denies any pain, incontinence of bowel/bladder, no pain in legs, numbness/tingling, loss of muscle or strength in lower extremities. He is not taking any medications for pain and is generally feeling well. His balance and gait are the same as well. No other complaints.\par \par

## 2021-10-27 ENCOUNTER — APPOINTMENT (OUTPATIENT)
Dept: DERMATOLOGY | Facility: CLINIC | Age: 76
End: 2021-10-27
Payer: MEDICARE

## 2021-10-27 PROCEDURE — 99212 OFFICE O/P EST SF 10 MIN: CPT | Mod: 25

## 2021-10-27 PROCEDURE — 17000 DESTRUCT PREMALG LESION: CPT

## 2021-10-27 PROCEDURE — 17003 DESTRUCT PREMALG LES 2-14: CPT

## 2021-12-20 ENCOUNTER — APPOINTMENT (OUTPATIENT)
Dept: DERMATOLOGY | Facility: CLINIC | Age: 76
End: 2021-12-20

## 2022-02-28 ENCOUNTER — APPOINTMENT (OUTPATIENT)
Dept: DERMATOLOGY | Facility: CLINIC | Age: 77
End: 2022-02-28

## 2022-06-28 ENCOUNTER — APPOINTMENT (OUTPATIENT)
Dept: ORTHOPEDIC SURGERY | Facility: CLINIC | Age: 77
End: 2022-06-28

## 2022-06-28 VITALS — WEIGHT: 185 LBS | HEIGHT: 70 IN | BODY MASS INDEX: 26.48 KG/M2

## 2022-06-28 DIAGNOSIS — M25.521 PAIN IN RIGHT ELBOW: ICD-10-CM

## 2022-06-28 DIAGNOSIS — Z86.79 PERSONAL HISTORY OF OTHER DISEASES OF THE CIRCULATORY SYSTEM: ICD-10-CM

## 2022-06-28 PROCEDURE — 73080 X-RAY EXAM OF ELBOW: CPT | Mod: RT

## 2022-06-28 PROCEDURE — 99204 OFFICE O/P NEW MOD 45 MIN: CPT

## 2022-06-28 RX ORDER — DICLOFENAC SODIUM 1% 10 MG/G
1 GEL TOPICAL
Qty: 1 | Refills: 2 | Status: ACTIVE | COMMUNITY
Start: 2022-06-28 | End: 1900-01-01

## 2022-06-28 NOTE — PHYSICAL EXAM
[de-identified] : Neurologic: normal coordination, normal DTR UE/LE , normal sensation, normal mood and affect, orientated and able to communicate. \par Skin: normal skin, no rash, no ulcers and no lesions. \par Lymphatic: no obvious lymphadenopathy in areas examined. \par Constitutional: well developed and well nourished. \par Cardiovascular: peripheral vascular exam is grossly normal. \par Pulmonary: no respiratory distress, lungs clear to auscultation bilaterally. \par Abdomen: normal bowel sounds, non-tender, no HSM and no mass.\par \par 3-view bilateral elbow X-ray \par Calcific tendonitis

## 2022-06-28 NOTE — HISTORY OF PRESENT ILLNESS
[de-identified] : The patient is a 77 year old R hand dominant M who presents today complaining of BILATERAL ELBOW PAIN.  \par Date of Injury/Onset: 04/2022\par Pain:    At Rest:  2/10 \par With Activity:      4/10 \par Mechanism of injury: NONE\par This is NOT a Work Related Injury being treated under Worker's Compensation.\par This is NOT an athletic injury occurring associated with an interscholastic or organized sports team.\par Quality of symptoms: SHARP, ACHING \par Improves with: REST\par Worse with: BENDING , EXTENSION \par Prior treatment: RHEUM \par Prior Imaging: XRAY - [facility] \par Out of work/sport: _, since _\par School/Sport/Position/Occupation: RETIRED \par Additional Information: None\par

## 2022-06-28 NOTE — DISCUSSION/SUMMARY
[de-identified] : All images reviewed with patient. Advised patient to start a HEP. Recommended Voltaren gel. Provided home exercise packet with instructions for home strengthening and stretching. Follow up in 4-6 weeks. \par \par -----------------------------------------------\par Home Exercise\par The patient is instructed on a home exercise program.\par \par BRODY RAZO Acting as a Scribe for Dr. Ellington\par I, Brody Razo, attest that this documentation has been prepared under the direction and in the presence of Provider Leonardo Ellington MD.\par \par Activity Modification\par The patient was advised to modify their activities.\par \par Dx / Natural History\par The patient was advised of the diagnosis.  The natural history of the pathology was explained in full to the patient in layman's terms.  Several different treatment options were discussed and explained in full to the patient including the risks and benefits of both surgical and non-surgical treatments.  All questions and concerns were answered.\par \par Pain Guide Activities\par The patient was advised to let pain guide the gradual advancement of activities.\par \par RICE\par I explained to the patient that rest, ice, compression, and elevation would benefit them.  They may return to activity after follow-up or when they no longer have any pain.\par

## 2023-03-06 ENCOUNTER — APPOINTMENT (OUTPATIENT)
Dept: NUCLEAR MEDICINE | Facility: CLINIC | Age: 78
End: 2023-03-06

## 2023-03-06 ENCOUNTER — APPOINTMENT (OUTPATIENT)
Dept: NUCLEAR MEDICINE | Facility: CLINIC | Age: 78
End: 2023-03-06
Payer: MEDICARE

## 2023-03-06 ENCOUNTER — OUTPATIENT (OUTPATIENT)
Dept: OUTPATIENT SERVICES | Facility: HOSPITAL | Age: 78
LOS: 1 days | End: 2023-03-06

## 2023-03-06 DIAGNOSIS — G31.84 MILD COGNITIVE IMPAIRMENT OF UNCERTAIN OR UNKNOWN ETIOLOGY: ICD-10-CM

## 2023-03-06 DIAGNOSIS — Z98.890 OTHER SPECIFIED POSTPROCEDURAL STATES: Chronic | ICD-10-CM

## 2023-03-06 PROCEDURE — 78803 RP LOCLZJ TUM SPECT 1 AREA: CPT | Mod: 26,MH

## 2023-03-20 ENCOUNTER — APPOINTMENT (OUTPATIENT)
Dept: DERMATOLOGY | Facility: CLINIC | Age: 78
End: 2023-03-20
Payer: MEDICARE

## 2023-03-20 PROCEDURE — 17000 DESTRUCT PREMALG LESION: CPT

## 2023-03-20 PROCEDURE — 99213 OFFICE O/P EST LOW 20 MIN: CPT | Mod: 25

## 2023-11-08 ENCOUNTER — RX ONLY (RX ONLY)
Age: 78
End: 2023-11-08

## 2023-11-08 ENCOUNTER — OFFICE (OUTPATIENT)
Dept: URBAN - METROPOLITAN AREA CLINIC 114 | Facility: CLINIC | Age: 78
Setting detail: OPHTHALMOLOGY
End: 2023-11-08
Payer: MEDICARE

## 2023-11-08 DIAGNOSIS — H01.005: ICD-10-CM

## 2023-11-08 DIAGNOSIS — H01.004: ICD-10-CM

## 2023-11-08 DIAGNOSIS — H01.002: ICD-10-CM

## 2023-11-08 DIAGNOSIS — H43.813: ICD-10-CM

## 2023-11-08 DIAGNOSIS — H25.13: ICD-10-CM

## 2023-11-08 DIAGNOSIS — H50.43: ICD-10-CM

## 2023-11-08 DIAGNOSIS — H01.001: ICD-10-CM

## 2023-11-08 PROCEDURE — 92004 COMPRE OPH EXAM NEW PT 1/>: CPT | Performed by: SPECIALIST

## 2023-11-08 ASSESSMENT — REFRACTION_AUTOREFRACTION
OD_AXIS: 87
OS_SPHERE: -0.25
OS_CYLINDER: -2.00
OS_AXIS: 70
OD_SPHERE: -0.75
OD_CYLINDER: -1.75

## 2023-11-08 ASSESSMENT — REFRACTION_MANIFEST
OS_SPHERE: -0.25
OS_VA1: 20/20
OS_ADD: +2.50
OD_ADD: +2.50
OD_CYLINDER: -1.75
OS_AXIS: 70
OS_CYLINDER: -2.00
OD_AXIS: 87
OD_SPHERE: -0.75
OD_VA1: 20/20

## 2023-11-08 ASSESSMENT — REFRACTION_CURRENTRX
OS_ADD: +2.25
OD_CYLINDER: -1.25
OS_CYLINDER: -1.00
OS_SPHERE: -0.75
OD_OVR_VA: 20/
OD_AXIS: 110
OD_SPHERE: -1.50
OD_ADD: +2.25
OS_OVR_VA: 20/
OS_AXIS: 64

## 2023-11-08 ASSESSMENT — SPHEQUIV_DERIVED
OD_SPHEQUIV: -1.625
OS_SPHEQUIV: -1.25
OD_SPHEQUIV: -1.625
OS_SPHEQUIV: -1.25

## 2023-11-08 ASSESSMENT — LID EXAM ASSESSMENTS
OS_BLEPHARITIS: LLL LUL 1+
OD_BLEPHARITIS: RLL RUL 1+

## 2023-11-08 ASSESSMENT — CONFRONTATIONAL VISUAL FIELD TEST (CVF)
OD_FINDINGS: FULL
OS_FINDINGS: FULL

## 2024-03-19 ENCOUNTER — APPOINTMENT (OUTPATIENT)
Dept: DERMATOLOGY | Facility: CLINIC | Age: 79
End: 2024-03-19
Payer: MEDICARE

## 2024-03-19 PROCEDURE — 17000 DESTRUCT PREMALG LESION: CPT | Mod: 59

## 2024-03-19 PROCEDURE — 17003 DESTRUCT PREMALG LES 2-14: CPT | Mod: 59

## 2024-03-19 PROCEDURE — 17110 DESTRUCTION B9 LES UP TO 14: CPT

## 2024-03-19 PROCEDURE — 99213 OFFICE O/P EST LOW 20 MIN: CPT | Mod: 25

## 2024-05-21 ENCOUNTER — APPOINTMENT (OUTPATIENT)
Dept: DERMATOLOGY | Facility: CLINIC | Age: 79
End: 2024-05-21
Payer: MEDICARE

## 2024-05-21 PROCEDURE — 11102 TANGNTL BX SKIN SINGLE LES: CPT

## 2024-05-21 PROCEDURE — 99212 OFFICE O/P EST SF 10 MIN: CPT | Mod: 25

## 2024-10-24 ENCOUNTER — APPOINTMENT (OUTPATIENT)
Dept: DERMATOLOGY | Facility: CLINIC | Age: 79
End: 2024-10-24
Payer: MEDICARE

## 2024-10-24 PROCEDURE — 17110 DESTRUCTION B9 LES UP TO 14: CPT

## 2024-10-24 PROCEDURE — 99214 OFFICE O/P EST MOD 30 MIN: CPT | Mod: 25

## 2025-03-19 ENCOUNTER — APPOINTMENT (OUTPATIENT)
Dept: DERMATOLOGY | Facility: CLINIC | Age: 80
End: 2025-03-19
Payer: MEDICARE

## 2025-03-19 PROCEDURE — 17000 DESTRUCT PREMALG LESION: CPT | Mod: 59

## 2025-03-19 PROCEDURE — 17110 DESTRUCTION B9 LES UP TO 14: CPT

## 2025-03-19 PROCEDURE — 17003 DESTRUCT PREMALG LES 2-14: CPT | Mod: 59

## 2025-03-19 PROCEDURE — 99213 OFFICE O/P EST LOW 20 MIN: CPT | Mod: 25
